# Patient Record
Sex: MALE | Race: WHITE
[De-identification: names, ages, dates, MRNs, and addresses within clinical notes are randomized per-mention and may not be internally consistent; named-entity substitution may affect disease eponyms.]

---

## 2022-01-14 ENCOUNTER — HOSPITAL ENCOUNTER (EMERGENCY)
Dept: HOSPITAL 75 - ER | Age: 42
Discharge: HOME | End: 2022-01-14
Payer: COMMERCIAL

## 2022-01-14 VITALS — WEIGHT: 264.55 LBS | HEIGHT: 66.14 IN | BODY MASS INDEX: 42.52 KG/M2

## 2022-01-14 VITALS — SYSTOLIC BLOOD PRESSURE: 127 MMHG | DIASTOLIC BLOOD PRESSURE: 92 MMHG

## 2022-01-14 DIAGNOSIS — R07.9: Primary | ICD-10-CM

## 2022-01-14 DIAGNOSIS — Z20.822: ICD-10-CM

## 2022-01-14 DIAGNOSIS — G47.33: ICD-10-CM

## 2022-01-14 DIAGNOSIS — K21.9: ICD-10-CM

## 2022-01-14 DIAGNOSIS — I10: ICD-10-CM

## 2022-01-14 DIAGNOSIS — J45.909: ICD-10-CM

## 2022-01-14 DIAGNOSIS — F17.290: ICD-10-CM

## 2022-01-14 DIAGNOSIS — Z79.899: ICD-10-CM

## 2022-01-14 DIAGNOSIS — F41.9: ICD-10-CM

## 2022-01-14 LAB
ALBUMIN SERPL-MCNC: 4.3 GM/DL (ref 3.2–4.5)
ALP SERPL-CCNC: 64 U/L (ref 40–136)
ALT SERPL-CCNC: 22 U/L (ref 0–55)
APTT BLD: 33 SEC (ref 24–35)
BASOPHILS # BLD AUTO: 0 10^3/UL (ref 0–0.1)
BASOPHILS NFR BLD AUTO: 1 % (ref 0–10)
BILIRUB SERPL-MCNC: 0.6 MG/DL (ref 0.1–1)
BUN/CREAT SERPL: 14
CALCIUM SERPL-MCNC: 8.7 MG/DL (ref 8.5–10.1)
CHLORIDE SERPL-SCNC: 105 MMOL/L (ref 98–107)
CO2 SERPL-SCNC: 23 MMOL/L (ref 21–32)
CREAT SERPL-MCNC: 0.85 MG/DL (ref 0.6–1.3)
EOSINOPHIL # BLD AUTO: 0.1 10^3/UL (ref 0–0.3)
EOSINOPHIL NFR BLD AUTO: 2 % (ref 0–10)
GFR SERPLBLD BASED ON 1.73 SQ M-ARVRAT: 112 ML/MIN
GLUCOSE SERPL-MCNC: 116 MG/DL (ref 70–105)
HCT VFR BLD CALC: 38 % (ref 40–54)
HGB BLD-MCNC: 13.1 G/DL (ref 13.3–17.7)
INR PPP: 1.1 (ref 0.8–1.4)
LYMPHOCYTES # BLD AUTO: 1 10^3/UL (ref 1–4)
LYMPHOCYTES NFR BLD AUTO: 17 % (ref 12–44)
MAGNESIUM SERPL-MCNC: 2.1 MG/DL (ref 1.6–2.4)
MANUAL DIFFERENTIAL PERFORMED BLD QL: NO
MCH RBC QN AUTO: 28 PG (ref 25–34)
MCHC RBC AUTO-ENTMCNC: 35 G/DL (ref 32–36)
MCV RBC AUTO: 82 FL (ref 80–99)
MONOCYTES # BLD AUTO: 0.5 10^3/UL (ref 0–1)
MONOCYTES NFR BLD AUTO: 8 % (ref 0–12)
NEUTROPHILS # BLD AUTO: 4.2 10^3/UL (ref 1.8–7.8)
NEUTROPHILS NFR BLD AUTO: 72 % (ref 42–75)
PLATELET # BLD: 150 10^3/UL (ref 130–400)
PMV BLD AUTO: 11.2 FL (ref 9–12.2)
POTASSIUM SERPL-SCNC: 4.2 MMOL/L (ref 3.6–5)
PROT SERPL-MCNC: 6.9 GM/DL (ref 6.4–8.2)
PROTHROMBIN TIME: 14.2 SEC (ref 12.2–14.7)
SODIUM SERPL-SCNC: 139 MMOL/L (ref 135–145)
T4 FREE SERPL-MCNC: 0.78 NG/DL (ref 0.7–1.48)
WBC # BLD AUTO: 5.9 10^3/UL (ref 4.3–11)

## 2022-01-14 PROCEDURE — 36415 COLL VENOUS BLD VENIPUNCTURE: CPT

## 2022-01-14 PROCEDURE — 80053 COMPREHEN METABOLIC PANEL: CPT

## 2022-01-14 PROCEDURE — 86141 C-REACTIVE PROTEIN HS: CPT

## 2022-01-14 PROCEDURE — 85379 FIBRIN DEGRADATION QUANT: CPT

## 2022-01-14 PROCEDURE — 83735 ASSAY OF MAGNESIUM: CPT

## 2022-01-14 PROCEDURE — 85730 THROMBOPLASTIN TIME PARTIAL: CPT

## 2022-01-14 PROCEDURE — 93041 RHYTHM ECG TRACING: CPT

## 2022-01-14 PROCEDURE — 83874 ASSAY OF MYOGLOBIN: CPT

## 2022-01-14 PROCEDURE — 83880 ASSAY OF NATRIURETIC PEPTIDE: CPT

## 2022-01-14 PROCEDURE — 84244 ASSAY OF RENIN: CPT

## 2022-01-14 PROCEDURE — 85610 PROTHROMBIN TIME: CPT

## 2022-01-14 PROCEDURE — 84443 ASSAY THYROID STIM HORMONE: CPT

## 2022-01-14 PROCEDURE — 84439 ASSAY OF FREE THYROXINE: CPT

## 2022-01-14 PROCEDURE — 82088 ASSAY OF ALDOSTERONE: CPT

## 2022-01-14 PROCEDURE — 84145 PROCALCITONIN (PCT): CPT

## 2022-01-14 PROCEDURE — 71045 X-RAY EXAM CHEST 1 VIEW: CPT

## 2022-01-14 PROCEDURE — 85025 COMPLETE CBC W/AUTO DIFF WBC: CPT

## 2022-01-14 PROCEDURE — 93005 ELECTROCARDIOGRAM TRACING: CPT

## 2022-01-14 PROCEDURE — 87636 SARSCOV2 & INF A&B AMP PRB: CPT

## 2022-01-14 PROCEDURE — 84484 ASSAY OF TROPONIN QUANT: CPT

## 2022-01-14 NOTE — ED CHEST PAIN
General


Chief Complaint:  Chest Pain


Stated Complaint:  CP,HA,SOB,HIGH BLOOD PRESSURE 193/138


Nursing Triage Note:  


PT AMB TO ED BY POV WITH C/O HIGH BP, CP, HA, AND SOB.  PT REPORTS HES HAD BP 


ISSUES FOR THE LAST 2 MONTHS.  REPORTS CP LAST NIGHT DESCRIBED AS "TINGLING," BP




AT 0500 THIS MORNING /138.


Source:  patient


Exam Limitations:  no limitations





History of Present Illness


Date Seen by Provider:  2022


Time Seen by Provider:  06:39


Initial Comments


This 41-year-old gentleman presents to the emergency room with about a month of 

intermittent episodes of chest discomfort described as a tingling in his central

chest that radiates to his back and into his left arm.  He has also had some 

recent subtle shortness of breath.  He is not having any chest discomfort at 

this moment.  He has a long history of labile and extreme hypertension.  He saw 

Dr. Reyes yesterday and his Coreg dose was increased.  Despite that he reports

a blood pressure this morning of 190 systolic.  He was suffering headache and 

chest discomfort at that time.  He took his usual morning medications as well as

hydralazine prior to arrival.  He is prescribed hydralazine as needed for 

hypertensive exacerbations.  He had a negative angiography in .  There are 

no stress tests or catheterizations on file since then.  Patient denies any 

excessive stimulant use except for greater than 4 cans of soda a day.  He denies

any drug or alcohol use.  He uses no dietary supplements, energy drinks, ADHD 

medications, etc.





Allergies and Home Medications


Allergies


Coded Allergies:  


     No Known Drug Allergies (Unverified , 12)





Patient Home Medication List


Home Medication List Reviewed:  Yes


Lisinopril (Prinivil) 20 Mg Tablet, 10 MG PO DAILY, (Reported)


   Entered as Reported by: KEN CHOI on 12


Ranitidine Hcl (Acid Control) 75 Mg Tablet, 1 TAB PO BID PRN, (Reported)


   Entered as Reported by: ZARINA TATE on 12





Review of Systems


Review of Systems


Constitutional:  no symptoms reported


EENTM:  No Symptoms Reported


Respiratory:  See HPI


Cardiovascular:  See HPI


Gastrointestinal:  No Symptoms Reported


Genitourinary:  No Symptoms Reported


Musculoskeletal:  no symptoms reported


Skin:  no symptoms reported


Psychiatric/Neurological:  Anxiety


Endocrine:  No Symptoms Reported


Hematologic/Lymphatic:  No Symptoms Reported





Past Medical-Social-Family Hx


Patient Social History


Tobacco Use?:  Yes


Smokeless Tobacco Frequency:  Current Everyday User


Use of E-Cig and/or Vaping dev:  No


Substance use?:  No


Alcohol Use?:  No


Pt feels they are or have been:  No





Immunizations Up To Date


Influenza Vaccine Up-to-Date:  No; Not Current


First/Initial COVID19 Vaccinat:  N/A





Past Medical History


Surgery/Hospitalization HX:  


HTN, ASTHMA


Surgeries:  Yes


Cardiac (Heart cath with no interventions )


Respiratory:  Yes


Asthma


Cardiac:  Yes


Hypertension


Neurological:  No


Reproductive Disorders:  No


Genitourinary:  No


Gastrointestinal:  Yes


Gastroesophageal Reflux


Musculoskeletal:  No


Endocrine:  No


HEENT:  No


Cancer:  No


Psychosocial:  Yes


Anxiety


Integumentary:  No





Physical Exam


Vital Signs





Vital Signs - First Documented








 22





 06:44


 


Temp 37.0


 


Pulse 70


 


Resp 16


 


B/P (MAP) 148/102 (117)


 


Pulse Ox 97


 


O2 Delivery Room Air





Capillary Refill : Less Than 3 Seconds


Height, Weight, BMI


Height: '"


Weight: lbs. oz. kg; 42.00 BMI


Method:Stated


General Appearance:  No Apparent Distress, WD/WN


HEENT:  PERRL/EOMI, Normal ENT Inspection


Neck:  Normal Inspection; No JVD


Respiratory:  Lungs Clear, Normal Breath Sounds, No Accessory Muscle Use


Cardiovascular:  Regular Rate, Rhythm, No Edema, No Murmur


Gastrointestinal:  Normal Bowel Sounds, Non Tender, Soft


Extremity:  Normal Inspection, Non Tender, No Pedal Edema


Neurologic/Psychiatric:  Alert, Oriented x3, No Motor/Sensory Deficits, Normal 

Mood/Affect, CNs II-XII Norm as Tested


Skin:  Normal Color, Warm/Dry





Progress/Results/Core Measures


Results/Orders


Lab Results





Laboratory Tests








Test


 22


06:52 22


07:10 Range/Units


 


 


White Blood Count


 5.9 


 


 4.3-11.0


10^3/uL


 


Red Blood Count


 4.62 


 


 4.30-5.52


10^6/uL


 


Hemoglobin 13.1 L  13.3-17.7  g/dL


 


Hematocrit 38 L  40-54  %


 


Mean Corpuscular Volume 82   80-99  fL


 


Mean Corpuscular Hemoglobin 28   25-34  pg


 


Mean Corpuscular Hemoglobin


Concent 35 


 


 32-36  g/dL





 


Red Cell Distribution Width 12.9   10.0-14.5  %


 


Platelet Count


 150 


 


 130-400


10^3/uL


 


Mean Platelet Volume 11.2   9.0-12.2  fL


 


Immature Granulocyte % (Auto) 1    %


 


Neutrophils (%) (Auto) 72   42-75  %


 


Lymphocytes (%) (Auto) 17   12-44  %


 


Monocytes (%) (Auto) 8   0-12  %


 


Eosinophils (%) (Auto) 2   0-10  %


 


Basophils (%) (Auto) 1   0-10  %


 


Neutrophils # (Auto)


 4.2 


 


 1.8-7.8


10^3/uL


 


Lymphocytes # (Auto)


 1.0 


 


 1.0-4.0


10^3/uL


 


Monocytes # (Auto)


 0.5 


 


 0.0-1.0


10^3/uL


 


Eosinophils # (Auto)


 0.1 


 


 0.0-0.3


10^3/uL


 


Basophils # (Auto)


 0.0 


 


 0.0-0.1


10^3/uL


 


Immature Granulocyte # (Auto)


 0.0 


 


 0.0-0.1


10^3/uL


 


Prothrombin Time 14.2   12.2-14.7  SEC


 


INR Comment 1.1   0.8-1.4  


 


Activated Partial


Thromboplast Time 33 


 


 24-35  SEC





 


D-Dimer


 0.29 


 


 0.00-0.49


UG/ML


 


Sodium Level 139   135-145  MMOL/L


 


Potassium Level 4.2   3.6-5.0  MMOL/L


 


Chloride Level 105     MMOL/L


 


Carbon Dioxide Level 23   21-32  MMOL/L


 


Anion Gap 11   5-14  MMOL/L


 


Blood Urea Nitrogen 12   7-18  MG/DL


 


Creatinine


 0.85 


 


 0.60-1.30


MG/DL


 


Estimat Glomerular Filtration


Rate 112 


 


  





 


BUN/Creatinine Ratio 14    


 


Glucose Level 116 H    MG/DL


 


Calcium Level 8.7   8.5-10.1  MG/DL


 


Corrected Calcium 8.5   8.5-10.1  MG/DL


 


Magnesium Level 2.1   1.6-2.4  MG/DL


 


Total Bilirubin 0.6   0.1-1.0  MG/DL


 


Aspartate Amino Transf


(AST/SGOT) 15 


 


 5-34  U/L





 


Alanine Aminotransferase


(ALT/SGPT) 22 


 


 0-55  U/L





 


Alkaline Phosphatase 64     U/L


 


Myoglobin


 27.6 


 


 10.0-92.0


NG/ML


 


Troponin I < 0.028   <0.028  NG/ML


 


C-Reactive Protein High


Sensitivity 0.33 


 


 0.00-0.50


MG/DL


 


B-Type Natriuretic Peptide 54.2   <100.0  PG/ML


 


Total Protein 6.9   6.4-8.2  GM/DL


 


Albumin 4.3   3.2-4.5  GM/DL


 


Renin Activity     


 


Aldosterone     NG/DL


 


Procalcitonin 0.01   <0.10  NG/ML


 


Thyroid Stimulating Hormone


(TSH) 0.78 


 


 0.35-4.94


UIU/ML


 


Free Thyroxine


 0.78 


 


 0.70-1.48


NG/DL


 


Influenza Type A (RT-PCR)  Not Detected  Not Detecte  


 


Influenza Type B (RT-PCR)  Not Detected  Not Detecte  


 


SARS-CoV-2 RNA (RT-PCR)  Not Detected  Not Detecte  








My Orders





Orders - MARIAH MAZA MD


Covid 19 Inhouse Test (22 06:39)


Influenza A And B By Pcr (22 06:39)


Cbc With Automated Diff (22 06:39)


Magnesium (22 06:39)


Chest 1 View, Ap/Pa Only (22 06:39)


Ekg Tracing (22 06:39)


Comprehensive Metabolic Panel (22 06:39)


Myoglobin Serum (22 06:39)


Protime With Inr (22 06:39)


Partial Thromboplastin Time (22 06:39)


O2 (22 06:39)


Monitor-Rhythm Ecg Trace Only (22 06:39)


Ed Iv/Invasive Line Start (22 06:39)


Troponin I LoÃ­za (22 06:39)


Bnp LoÃ­za (22 06:39)


Hs C Reactive Protein (22 06:39)


Fibrin Degradation Products (22 06:39)


Procalcitonin (Pct) (22 06:39)


Thyroid Stimulating Hormone (22 07:03)


Free T4 (Free Thyroxine) (22 07:03)


Renin Activity Plasma (22 08:31)


Aldosterone Serum (22 08:31)


Aldosterone Renin Ratio (22 08:31)





Vital Signs/I&O











 22





 06:44 09:00


 


Temp 37.0 


 


Pulse 70 64


 


Resp 16 16


 


B/P (MAP) 148/102 (117) 127/92


 


Pulse Ox 97 96


 


O2 Delivery Room Air Room Air














Blood Pressure Mean:                    117











Progress


Progress Note #1:  


   Time:  07:39


Progress Note


Chest pain and headache have improved.  Blood pressure is significantly improved

from his measurements at home.  Chest pain work-up is underway.


Progress Note #2:  


   Time:  08:36


Progress Note


I discussed the situation with Dr. Reyes.  Work-up here was unremarkable.  Bl

ood pressure has improved without additional treatment.  Dr. Reyes would like 

him to allow medications a week or 2 to take effect before determining if 

further measures need to be taken.  Patient did reveal that he has not been 

using his CPAP routinely.  I have strongly encouraged him to use CPAP as this 

could be a major contributing factor to his headaches and labile hypertension.  

I have instructed him to continue working on weight loss.  Dietary 

recommendations are also being provided to reduce risk of acid reflux.  Renal 

and and aldosterone labs have been ordered for outpatient work-up.  We will try 

to obtain those on his blood work here to avoid creating another encounter for 

blood draw.


Initial ECG Impression Date:  2022


Initial ECG Impression Time:  06:55


Initial ECG Rate:  68


Initial ECG Rhythm:  Normal Sinus


Comment


Sinus rhythm with no diagnostic ST elevation or depression.  Incomplete right 

bundle branch block by automated read.  No axis deviation.  No STEMI.





Diagnostic Imaging





   Diagonstic Imaging:  Xray


   Plain Films/CT/US/NM/MRI:  chest


Comments


NAME:   KAREEM MILLARD


[facility] REC#:   [id number]


ACCOUNT#:   [id number]


PT STATUS:   REG ER


:   1980


PHYSICIAN:   MARIAH MAZA MD


ADMIT DATE:   22/ER


                                   ***Draft***


Date of Exam:22





CHEST 1 VIEW, AP/PA ONLY





EXAM: CHEST 1 VIEW, AP/PA ONLY





INDICATION: Chest pain.





COMPARISON: 2012.





FINDINGS: Normal heart size and pulmonary vascularity. No dense


consolidation, pleural effusion or pneumothorax. No acute osseous


findings.





IMPRESSION: No acute cardiopulmonary findings.





  Dictated on workstation # [id number]





Dict:   [id number]


Trans:   22 0714


[place] [phone]





Interpreted by:     YANIQUE BLUNT MD





Departure


Impression





   Primary Impression:  


   Chest pain


   Qualified Codes:  R07.9 - Chest pain, unspecified


   Additional Impressions:  


   Labile hypertension


   Anxiety


   Obstructive sleep apnea


Disposition:  01 HOME, SELF-CARE


Condition:  Stable





Departure-Patient Inst.


Decision time for Depature:  08:35


Referrals:  


SAM TONG MD (PCP/Family)


Primary Care Physician


Patient Instructions:  High Blood Pressure in Adults, Anxiety, Adult (DC)





Add. Discharge Instructions:  


Continue your medications as previously directed.


Continue to work on weight loss and use your CPAP religiously anytime you are 

asleep.


Continue to work on treating acid reflux with the increased dose of an acids as 

directed by Dr. Reyes.  Also avoid the following: Eating large meals, eating 

close to bedtime, caffeine, carbonation, chocolate, citrus fruits and juices, 

tomato products, alcohol, tobacco, NSAID medications such as ibuprofen or 

naproxen, spicy foods, fatty or greasy foods, mints, or anything else you know 

irritates your stomach.


The additional blood work ordered by Dr. Reyes has been ordered through your 

blood draw during the ER visit.  You should not need to go in for additional 

blood work.


Call with questions or concerns.


Return to the ER if you have worsening symptoms.





All discharge instructions reviewed with patient and/or family. Voiced 

understanding.





Copy


Copies To 1:   RAVEN REYES JR, MD


Copies To 2:   SAM TONG MD, JOSHUA T MD        2022 07:17

## 2022-01-14 NOTE — DIAGNOSTIC IMAGING REPORT
EXAM: CHEST 1 VIEW, AP/PA ONLY



INDICATION: Chest pain.



COMPARISON: 01/25/2012.



FINDINGS: Normal heart size and pulmonary vascularity. No dense

consolidation, pleural effusion or pneumothorax. No acute osseous

findings.



IMPRESSION: No acute cardiopulmonary findings.



Dictated by: 



  Dictated on workstation # [id number]

## 2022-02-03 ENCOUNTER — HOSPITAL ENCOUNTER (OUTPATIENT)
Dept: HOSPITAL 75 - CARD | Age: 42
End: 2022-02-03
Attending: INTERNAL MEDICINE
Payer: COMMERCIAL

## 2022-02-03 VITALS — WEIGHT: 260.15 LBS | HEIGHT: 65.75 IN | BODY MASS INDEX: 42.31 KG/M2

## 2022-02-03 VITALS — SYSTOLIC BLOOD PRESSURE: 117 MMHG | DIASTOLIC BLOOD PRESSURE: 72 MMHG

## 2022-02-03 DIAGNOSIS — R94.31: Primary | ICD-10-CM

## 2022-02-03 PROCEDURE — 78452 HT MUSCLE IMAGE SPECT MULT: CPT

## 2022-02-03 PROCEDURE — 93017 CV STRESS TEST TRACING ONLY: CPT

## 2022-02-03 NOTE — NUCLEAR STRESS TEST
TREADMILL NUCLEAR STRESS TEST


Date of procedure: 02/03/2022.


Primary care provider: Asa Catalan MD.


Admitting physician: Ryland Reyes Jr., MD.





INDICATION: Abnormal electrocardiogram.





BASELINE ELECTROCARDIOGRAM: 


Sinus rhythm with incomplete right bundle branch block.





STRESS TEST PROCEDURE:  The patient was exercised for a total of 10 minutes and 

0 seconds of the standard John protocol achieving a maximum MET level of 10.3. 

The resting heart rate was 63 bpm and the peak heart rate was 154 bpm, which 

represents 86% of the maximum predicted heart rate.  The resting blood pressure 

was 117/72 mmHg and the peak blood pressure was 165/90 mmHg.  This represents a 

normal heart rate and a normal blood pressure response to exercise.  The test 

was stopped due to fatigue.  There was no chest discomfort during the test.  

There were no arrhythmias during the test.  There were no significant stress 

induced electrocardiogram changes. The patient exhibited excellent exercise 

capacity for age.





NUCLEAR PROCEDURE: The patient was administered 10.3 mCi of intravenous 

technetium 99m Tetrofosmin at rest for the rest images.  The patient was 

subsequently administered 32.5 mCi of intravenous technetium 99 M Tetrofosmin at

peak stress for the stress images.  Following an appropriate wait after each 

injection, imaging was obtained.  The images were subsequently processed and ref

ormatted in the usual views.  Gated imaging was obtained.  The image quality was

adequate but with gastrointestinal and motion attenuation artifacts. CT 

attenuation correction was used as a adjunct to standard imaging. Both the 

corrected and uncorrected images were reviewed for interpretation. 





NUCLEAR RESULTS: There was normal myocardial perfusion in all segments without 

evidence of infarction or ischemia.  There was normal left ventricular chamber 

size with an end-diastolic volume of 73 mL and an end-systolic volume of 24 mL. 

There was no evidence of transient ischemic dilatation.  The TID ratio was 0.97.

 There was normal wall motion in all segments with a calculated ejection 

fraction of 67%.





IMPRESSION:


1. Normal heart rate and blood pressure response to exercise.


2. There was no chest discomfort, arrhythmias, or electrocardiogram changes 

during the test.


3. The patient exhibited excellent exercise capacity for age at 10 minutes of 

the John protocol.


4. There was normal myocardial perfusion in all segments without evidence of 

infarction or ischemia.


5. There was normal wall motion in all segments with a calculated ejection fra

ction of 67%.





Certain portions of this document may have been dictated utilizing voice 

recognition technology.  Inherent to this technology, typographical and 

grammatical errors may exist.  As much as I am diligent to identify and correct 

these mistakes, some errors may remain in the document.











RYLAND REYES JR, MD          Feb 3, 2022 12:35

## 2022-03-10 ENCOUNTER — HOSPITAL ENCOUNTER (OUTPATIENT)
Dept: HOSPITAL 75 - PREOP | Age: 42
Discharge: HOME | End: 2022-03-10
Attending: SURGERY
Payer: COMMERCIAL

## 2022-03-10 VITALS — HEIGHT: 66.02 IN | BODY MASS INDEX: 41.88 KG/M2 | WEIGHT: 260.59 LBS

## 2022-03-10 DIAGNOSIS — Z20.822: ICD-10-CM

## 2022-03-10 DIAGNOSIS — Z01.812: Primary | ICD-10-CM

## 2022-03-10 PROCEDURE — 87635 SARS-COV-2 COVID-19 AMP PRB: CPT

## 2022-03-14 ENCOUNTER — HOSPITAL ENCOUNTER (OUTPATIENT)
Dept: HOSPITAL 75 - ENDO | Age: 42
Discharge: HOME | End: 2022-03-14
Attending: SURGERY
Payer: COMMERCIAL

## 2022-03-14 VITALS — SYSTOLIC BLOOD PRESSURE: 124 MMHG | DIASTOLIC BLOOD PRESSURE: 65 MMHG

## 2022-03-14 VITALS — SYSTOLIC BLOOD PRESSURE: 123 MMHG | DIASTOLIC BLOOD PRESSURE: 74 MMHG

## 2022-03-14 VITALS — SYSTOLIC BLOOD PRESSURE: 121 MMHG | DIASTOLIC BLOOD PRESSURE: 64 MMHG

## 2022-03-14 VITALS — BODY MASS INDEX: 41.81 KG/M2 | HEIGHT: 66.14 IN | WEIGHT: 260.15 LBS

## 2022-03-14 VITALS — DIASTOLIC BLOOD PRESSURE: 74 MMHG | SYSTOLIC BLOOD PRESSURE: 123 MMHG

## 2022-03-14 VITALS — DIASTOLIC BLOOD PRESSURE: 75 MMHG | SYSTOLIC BLOOD PRESSURE: 117 MMHG

## 2022-03-14 DIAGNOSIS — F17.220: ICD-10-CM

## 2022-03-14 DIAGNOSIS — K21.00: ICD-10-CM

## 2022-03-14 DIAGNOSIS — K44.9: ICD-10-CM

## 2022-03-14 DIAGNOSIS — R07.9: ICD-10-CM

## 2022-03-14 DIAGNOSIS — K29.50: Primary | ICD-10-CM

## 2022-03-14 NOTE — PROGRESS NOTE-PRE OPERATIVE
Pre-Operative Progress Note


H&P Reviewed


The H&P was reviewed, patient examined and no changes noted.


Time Seen by Provider:  11:22


Date H&P Reviewed:  Mar 14, 2022


Time H&P Reviewed:  11:22


Pre-Operative Diagnosis:  JACQUELINE LEMONS DO               Mar 14, 2022 11:41

## 2022-03-14 NOTE — ANESTHESIA-GENERAL POST-OP
MAC


Patient Condition


Mental Status/LOC:  Same as Preop


Cardiovascular:  Satisfactory


Nausea/Vomiting:  Absent


Respiratory:  Satisfactory


Pain:  Controlled


Complications:  Absent





Post Op Complications


Complications


None





Follow Up Care/Instructions


Patient Instructions


None needed.





Anesthesiology Discharge Order


Discharge Order


Patient is doing well, no complaints, stable vital signs, no apparent adverse 

anesthesia problems.   


No complications reported per nursing.











ELISA FARIAS CRNA         Mar 14, 2022 14:02

## 2022-03-14 NOTE — PROGRESS NOTE-POST OPERATIVE
Post-Operative Progess Note


Surgeon (s)/Assistant (s)


Surgeon


JACQUELINE TRACY DO


Assistant:  none





Pre-Operative Diagnosis


GERD





Post-Operative Diagnosis





Gastritis


mild hiatal hernia





Procedure & Operative Findings


Date of Procedure


3/14/22


Procedure Performed/Findings


EGD with bx





PROCEDURE NOTE:


After informed consent was obtained, the patient was brought to the endoscopy


suite, placed in bed in left lateral decubitus position.  He was administered


IV sedation by the CRNA who then monitored  vitals the entire time, heart


rate, blood pressure and pulse ox and the scope was inserted down the mouth


through the esophagus into the stomach.  On the way down, noted some mild


esophagitis, took a picture, pushed into the stomach, pushed past the antrum


into the duodenum.  Duodenum looked good.  Pulled back and did a biopsy of


antrum, then retroflexed the scope, saw hiatal hernia, took a picture of this


and then pulled the scope into the GE junction, took another picture of the


hiatal hernia and then did a biopsy of the GE junction.  Pushed the scope back


into the stomach, suctioned all the air out of the stomach. At this point pulled

the 


scope up the esophagus and out the mouth. 





The patient tolerated the procedure, and he recovered in endoscopy suite.


Anesthesia Type


IV sedation by CRNA





Estimated Blood Loss


Estimated blood loss (mL):  scant





Specimens/Packing


Specimens Removed


antral bx


GE jxn bx











JACQUELINE TRACY DO               Mar 14, 2022 12:37

## 2022-03-14 NOTE — ENDOSCOPY DISCHARGE INSTRUCT
Endo Procedure/Findings


Findings


1.:  Gastritis


2.:  Hiatal Hernia





Discharge Instructions


-


Activity: You might feel a little sleepy until tomorrow.  This is due to the 

medicine you received to relax you.





Until tomorrow, you should:  


   NOT drive a car, operate machinery or power tools.


   NOT drink any alcoholic beverages.


   NOT make any important decisions or sign importortant papers.





Do not return to work until tomorrow, unless otherwise instructed. Resume 

previous activities tomorrow.





Diet: Start by taking liquids.  If you tolerate liquids, advance to solid food.


1.:  EGD in 3 years





Notify Physician


-


If you experience excessive bleeding, unusual abdominal pain, fever, or chest 

pain, contact your doctor immediately.











JACQUELINE TRACY DO               Mar 14, 2022 12:37

## 2022-04-06 ENCOUNTER — HOSPITAL ENCOUNTER (OUTPATIENT)
Dept: HOSPITAL 75 - RT | Age: 42
End: 2022-04-06
Attending: INTERNAL MEDICINE
Payer: COMMERCIAL

## 2022-04-06 DIAGNOSIS — R06.09: Primary | ICD-10-CM

## 2022-04-06 PROCEDURE — 94060 EVALUATION OF WHEEZING: CPT

## 2022-04-06 PROCEDURE — 94726 PLETHYSMOGRAPHY LUNG VOLUMES: CPT

## 2022-04-06 PROCEDURE — 94729 DIFFUSING CAPACITY: CPT

## 2022-04-07 ENCOUNTER — HOSPITAL ENCOUNTER (OUTPATIENT)
Dept: HOSPITAL 75 - CATH | Age: 42
End: 2022-04-07
Attending: INTERNAL MEDICINE
Payer: COMMERCIAL

## 2022-04-07 VITALS — SYSTOLIC BLOOD PRESSURE: 139 MMHG | DIASTOLIC BLOOD PRESSURE: 97 MMHG

## 2022-04-07 VITALS — SYSTOLIC BLOOD PRESSURE: 141 MMHG | DIASTOLIC BLOOD PRESSURE: 80 MMHG

## 2022-04-07 VITALS — WEIGHT: 259.7 LBS | HEIGHT: 66.5 IN | BODY MASS INDEX: 41.25 KG/M2

## 2022-04-07 VITALS — DIASTOLIC BLOOD PRESSURE: 91 MMHG | SYSTOLIC BLOOD PRESSURE: 132 MMHG

## 2022-04-07 VITALS — SYSTOLIC BLOOD PRESSURE: 150 MMHG | DIASTOLIC BLOOD PRESSURE: 90 MMHG

## 2022-04-07 VITALS — SYSTOLIC BLOOD PRESSURE: 148 MMHG | DIASTOLIC BLOOD PRESSURE: 99 MMHG

## 2022-04-07 VITALS — DIASTOLIC BLOOD PRESSURE: 90 MMHG | SYSTOLIC BLOOD PRESSURE: 132 MMHG

## 2022-04-07 VITALS — DIASTOLIC BLOOD PRESSURE: 96 MMHG | SYSTOLIC BLOOD PRESSURE: 139 MMHG

## 2022-04-07 VITALS — DIASTOLIC BLOOD PRESSURE: 71 MMHG | SYSTOLIC BLOOD PRESSURE: 114 MMHG

## 2022-04-07 DIAGNOSIS — G47.33: ICD-10-CM

## 2022-04-07 DIAGNOSIS — I10: ICD-10-CM

## 2022-04-07 DIAGNOSIS — R06.09: ICD-10-CM

## 2022-04-07 DIAGNOSIS — E66.01: ICD-10-CM

## 2022-04-07 DIAGNOSIS — K20.90: ICD-10-CM

## 2022-04-07 DIAGNOSIS — J45.909: ICD-10-CM

## 2022-04-07 DIAGNOSIS — R94.31: ICD-10-CM

## 2022-04-07 DIAGNOSIS — K29.70: ICD-10-CM

## 2022-04-07 DIAGNOSIS — Z99.89: ICD-10-CM

## 2022-04-07 DIAGNOSIS — Z79.899: ICD-10-CM

## 2022-04-07 DIAGNOSIS — R07.89: Primary | ICD-10-CM

## 2022-04-07 DIAGNOSIS — R94.4: ICD-10-CM

## 2022-04-07 DIAGNOSIS — F41.9: ICD-10-CM

## 2022-04-07 PROCEDURE — 93454 CORONARY ARTERY ANGIO S&I: CPT

## 2022-04-07 NOTE — PRE-OP NOTE & CONSCIOUS SEDAT
Pre-Operative Progress Note


H&P Reviewed


The H&P was reviewed, patient examined and no changes noted.


Date H&P Reviewed:  Apr 7, 2022


Time H&P Reviewed:  12:47


Pre-Op Diagnosis:  Chest pain





Conscious Sedation Pre-Proced


ASA Score


2


For ASA 3 and 4: Consider anesthesia and medical clearance. Also, for patients

with a history of failed moderate sedation consider anesthesia.

















Airway 


 


Lungs 


 


Heart 


 


 ASA score


 


 ASA 1: a normal healthy patient


 


 ASA 2:  a patient with a mild systemic disease (mid diabetes, controlled 

hypertension, obesity 


 


 ASA 3:  a patient with a severe systemic disease that limits activity  (angina,

COPD, prior Myocardial infarction)


 


 ASA 4:  a patient with an incapacitating disease that is a constant threat to 

life (CHF, renal failure)


 


 ASA 5:  a moribund patient not expected to survive 24 hrs.  (ruptured aneurysm)


 


 ASA 6:  a declared brain-dead patient whose organs are being harvested.


 


 For emergent operations, add the letter E after the classification











Mallampati Classification


Grade 2





Sedation Plan


Analgesia, Amnesia, Plan communicated to team members, Discussed options with 

patient/fam, Discussed risks with patient/fam


The patient is an appropriate candidate to undergo the planned procedure, 

sedation, and anesthesia.





The patient immediately re-assessed prior to indication.











RAVEN TIM JR, MD          Apr 7, 2022 12:48

## 2022-04-07 NOTE — CARDIAC CATH REPORT
CARDIAC CATHETERIZATION


DATE OF PROCEDURE: 04/07/2022





INDICATION: Chest pain.





HISTORY: The patient is a 41 year old male with no known history of coronary 

artery disease who has had persistent chest discomfort over the past several 

months.  He underwent a nuclear stress test that was normal.  He was placed on a

proton pump inhibitor but continued to have chest discomfort and dyspnea.  As 

such, he is now referred for further evaluation with a cardiac catheterization.





PROCEDURES PERFORMED: 


1.  Left heart catheterization with hemodynamic measurements.


2.  Diagnostic native coronary angiography.





PROCEDURE DESCRIPTION: After informed consent and in the fasting state, left 

heart catheterization was performed through the right radial artery utilizing a 

6 Mexican system by percutaneous approach.  Standard 5 Mexican Jay catheters 

as well as a 5 Mexican AR mod catheter and a 5 Mexican Leo right catheter 

were utilized for the diagnostic portion of the procedure.  All catheters were 

exchanged over a guidewire.  Following the procedure, a vascular band was 

applied to the radial artery access site and the sheath was removed with good 

hemostasis.





RESULTS:





HEMODYNAMICS: The aortic pressure was 142/89 mmHg.  The left ventricular 

pressure was 148/0 mmHg with a left ventricular end-diastolic pressure of 20 

mmHg.  There was no significant pressure gradient upon pullback across aortic 

valve.





CORONARY ANGIOGRAPHY:





Left main coronary artery: Free of significant disease.





Left anterior descending coronary artery: Free of significant disease.





Left circumflex coronary artery: Free of significant disease.





Right coronary artery: Dominant and free of significant disease.








IMPRESSION:


1.  Systemic hypertension with elevated left ventricular end-diastolic pressure.


2.  Angiographically normal-appearing coronary arteries in a right dominant 

system.


3.  The patient is known to have normal left ventricular systolic function with 

a calculated ejection fraction of 67% by nuclear stress test performed on 02/0

3/2022.





Certain portions of this document may have been dictated utilizing voice 

recognition technology.  Inherent to this technology, typographical and 

grammatical errors may exist.  As much as I am diligent to identify and correct 

these mistakes, some errors may remain in the document.











RAVEN TIM JR, MD          Apr 7, 2022 16:41

## 2022-04-08 ENCOUNTER — HOSPITAL ENCOUNTER (OUTPATIENT)
Dept: HOSPITAL 75 - RAD | Age: 42
End: 2022-04-08
Attending: INTERNAL MEDICINE
Payer: COMMERCIAL

## 2022-04-08 DIAGNOSIS — J18.9: Primary | ICD-10-CM

## 2022-04-08 DIAGNOSIS — R91.8: ICD-10-CM

## 2022-04-08 PROCEDURE — 71260 CT THORAX DX C+: CPT

## 2022-04-08 NOTE — DIAGNOSTIC IMAGING REPORT
EXAMINATION: CT chest with intravenous contrast.



TECHNIQUE: Multiple contiguous axial images were obtained through

the chest after the uneventful administration of intravenous

contrast. All CT scans use one or more of the following dose

optimizing techniques: automated exposure control, MA and/or KvP

adjustment based on patient size and exam type or iterative

reconstruction. 



HISTORY: Left-sided chest pain and shortness of breath



COMPARISON: None available.



FINDINGS: 



Thyroid: The thyroid is normal.



Mediastinum: Heart size is normal without significant pericardial

effusion. The aorta is normal in caliber. No suspicious

lymphadenopathy.



Lungs and airways: The lungs are clear without consolidation,

pleural effusion, or pneumothorax. There are reticular nodular

opacities within the inferior medial right upper and left upper

lobes. The airways are normal.



Upper abdomen: The subphrenic structures are normal. 



Musculoskeletal: Degenerative changes of the spine without

suspicious osseous lesion or compression fracture.



IMPRESSION:



1. Reticulonodular opacities within the medial bilateral upper

lobes which can be seen with atypical infection.



Dictated by: 



  Dictated on workstation # [id number]